# Patient Record
Sex: FEMALE | Race: WHITE | NOT HISPANIC OR LATINO | Employment: FULL TIME | URBAN - METROPOLITAN AREA
[De-identification: names, ages, dates, MRNs, and addresses within clinical notes are randomized per-mention and may not be internally consistent; named-entity substitution may affect disease eponyms.]

---

## 2017-03-15 ENCOUNTER — TRANSCRIBE ORDERS (OUTPATIENT)
Dept: MAMMOGRAPHY | Facility: HOSPITAL | Age: 57
End: 2017-03-15

## 2017-03-15 DIAGNOSIS — Z12.31 VISIT FOR SCREENING MAMMOGRAM: Primary | ICD-10-CM

## 2017-03-27 ENCOUNTER — APPOINTMENT (OUTPATIENT)
Dept: OTHER | Facility: HOSPITAL | Age: 57
End: 2017-03-27

## 2017-03-27 ENCOUNTER — HOSPITAL ENCOUNTER (OUTPATIENT)
Dept: MAMMOGRAPHY | Facility: HOSPITAL | Age: 57
Discharge: HOME | End: 2017-03-27
Admitting: INTERNAL MEDICINE

## 2017-03-27 DIAGNOSIS — Z92.89 H/O MAMMOGRAM: ICD-10-CM

## 2017-03-27 DIAGNOSIS — Z12.31 VISIT FOR SCREENING MAMMOGRAM: ICD-10-CM

## 2017-03-27 PROCEDURE — 77067 SCR MAMMO BI INCL CAD: CPT | Performed by: RADIOLOGY

## 2017-03-27 PROCEDURE — G0202 SCR MAMMO BI INCL CAD: HCPCS

## 2017-03-27 PROCEDURE — 77063 BREAST TOMOSYNTHESIS BI: CPT

## 2017-03-27 PROCEDURE — 77063 BREAST TOMOSYNTHESIS BI: CPT | Performed by: RADIOLOGY

## 2017-03-31 ENCOUNTER — TRANSCRIBE ORDERS (OUTPATIENT)
Dept: PHYSICAL THERAPY | Facility: HOSPITAL | Age: 57
End: 2017-03-31

## 2017-03-31 DIAGNOSIS — M54.30 SCIATICA, UNSPECIFIED LATERALITY: Primary | ICD-10-CM

## 2017-06-20 ENCOUNTER — HOSPITAL ENCOUNTER (EMERGENCY)
Facility: HOSPITAL | Age: 57
Discharge: HOME | End: 2017-06-20
Attending: EMERGENCY MEDICINE | Admitting: EMERGENCY MEDICINE

## 2017-06-20 ENCOUNTER — APPOINTMENT (OUTPATIENT)
Dept: GENERAL RADIOLOGY | Facility: HOSPITAL | Age: 57
End: 2017-06-20

## 2017-06-20 VITALS
WEIGHT: 244 LBS | DIASTOLIC BLOOD PRESSURE: 78 MMHG | SYSTOLIC BLOOD PRESSURE: 114 MMHG | OXYGEN SATURATION: 98 % | BODY MASS INDEX: 47.91 KG/M2 | RESPIRATION RATE: 16 BRPM | HEIGHT: 60 IN | HEART RATE: 93 BPM | TEMPERATURE: 97.8 F

## 2017-06-20 DIAGNOSIS — J01.00 ACUTE MAXILLARY SINUSITIS, RECURRENCE NOT SPECIFIED: ICD-10-CM

## 2017-06-20 DIAGNOSIS — R06.02 SHORTNESS OF BREATH: Primary | ICD-10-CM

## 2017-06-20 LAB
ALBUMIN SERPL-MCNC: 4.7 G/DL (ref 3.2–4.8)
ALBUMIN/GLOB SERPL: 1.4 G/DL (ref 1.5–2.5)
ALP SERPL-CCNC: 80 U/L (ref 25–100)
ALT SERPL W P-5'-P-CCNC: 27 U/L (ref 7–40)
ANION GAP SERPL CALCULATED.3IONS-SCNC: 12 MMOL/L (ref 3–11)
AST SERPL-CCNC: 28 U/L (ref 0–33)
B-HCG UR QL: NEGATIVE
BASOPHILS # BLD AUTO: 0.08 10*3/MM3 (ref 0–0.2)
BASOPHILS NFR BLD AUTO: 0.8 % (ref 0–1)
BILIRUB SERPL-MCNC: 0.2 MG/DL (ref 0.3–1.2)
BNP SERPL-MCNC: 21 PG/ML (ref 0–100)
BUN BLD-MCNC: 11 MG/DL (ref 9–23)
BUN/CREAT SERPL: 15.7 (ref 7–25)
CALCIUM SPEC-SCNC: 10.3 MG/DL (ref 8.7–10.4)
CHLORIDE SERPL-SCNC: 103 MMOL/L (ref 99–109)
CO2 SERPL-SCNC: 25 MMOL/L (ref 20–31)
CREAT BLD-MCNC: 0.7 MG/DL (ref 0.6–1.3)
DEPRECATED RDW RBC AUTO: 42.3 FL (ref 37–54)
EOSINOPHIL # BLD AUTO: 0.22 10*3/MM3 (ref 0.1–0.3)
EOSINOPHIL NFR BLD AUTO: 2.3 % (ref 0–3)
ERYTHROCYTE [DISTWIDTH] IN BLOOD BY AUTOMATED COUNT: 14.8 % (ref 11.3–14.5)
GFR SERPL CREATININE-BSD FRML MDRD: 87 ML/MIN/1.73
GLOBULIN UR ELPH-MCNC: 3.4 GM/DL
GLUCOSE BLD-MCNC: 144 MG/DL (ref 70–100)
HCT VFR BLD AUTO: 35.5 % (ref 34.5–44)
HGB BLD-MCNC: 11 G/DL (ref 11.5–15.5)
HOLD SPECIMEN: NORMAL
HOLD SPECIMEN: NORMAL
IMM GRANULOCYTES # BLD: 0.03 10*3/MM3 (ref 0–0.03)
IMM GRANULOCYTES NFR BLD: 0.3 % (ref 0–0.6)
INTERNAL NEGATIVE CONTROL: NEGATIVE
INTERNAL POSITIVE CONTROL: POSITIVE
LYMPHOCYTES # BLD AUTO: 1.68 10*3/MM3 (ref 0.6–4.8)
LYMPHOCYTES NFR BLD AUTO: 17.3 % (ref 24–44)
Lab: NORMAL
MCH RBC QN AUTO: 24.2 PG (ref 27–31)
MCHC RBC AUTO-ENTMCNC: 31 G/DL (ref 32–36)
MCV RBC AUTO: 78.2 FL (ref 80–99)
MONOCYTES # BLD AUTO: 0.71 10*3/MM3 (ref 0–1)
MONOCYTES NFR BLD AUTO: 7.3 % (ref 0–12)
NEUTROPHILS # BLD AUTO: 7 10*3/MM3 (ref 1.5–8.3)
NEUTROPHILS NFR BLD AUTO: 72 % (ref 41–71)
PLATELET # BLD AUTO: 347 10*3/MM3 (ref 150–450)
PMV BLD AUTO: 10.8 FL (ref 6–12)
POTASSIUM BLD-SCNC: 4.2 MMOL/L (ref 3.5–5.5)
PROT SERPL-MCNC: 8.1 G/DL (ref 5.7–8.2)
RBC # BLD AUTO: 4.54 10*6/MM3 (ref 3.89–5.14)
SODIUM BLD-SCNC: 140 MMOL/L (ref 132–146)
TROPONIN I SERPL-MCNC: 0 NG/ML (ref 0–0.07)
WBC NRBC COR # BLD: 9.72 10*3/MM3 (ref 3.5–10.8)
WHOLE BLOOD HOLD SPECIMEN: NORMAL
WHOLE BLOOD HOLD SPECIMEN: NORMAL

## 2017-06-20 PROCEDURE — 80053 COMPREHEN METABOLIC PANEL: CPT | Performed by: EMERGENCY MEDICINE

## 2017-06-20 PROCEDURE — 83880 ASSAY OF NATRIURETIC PEPTIDE: CPT | Performed by: EMERGENCY MEDICINE

## 2017-06-20 PROCEDURE — 99284 EMERGENCY DEPT VISIT MOD MDM: CPT

## 2017-06-20 PROCEDURE — 93005 ELECTROCARDIOGRAM TRACING: CPT | Performed by: EMERGENCY MEDICINE

## 2017-06-20 PROCEDURE — 71010 HC CHEST PA OR AP: CPT

## 2017-06-20 PROCEDURE — 85025 COMPLETE CBC W/AUTO DIFF WBC: CPT | Performed by: EMERGENCY MEDICINE

## 2017-06-20 PROCEDURE — 84484 ASSAY OF TROPONIN QUANT: CPT

## 2017-06-20 RX ORDER — SODIUM CHLORIDE 0.9 % (FLUSH) 0.9 %
10 SYRINGE (ML) INJECTION AS NEEDED
Status: DISCONTINUED | OUTPATIENT
Start: 2017-06-20 | End: 2017-06-20 | Stop reason: HOSPADM

## 2017-06-20 RX ORDER — AZITHROMYCIN 250 MG/1
TABLET, FILM COATED ORAL
Qty: 6 TABLET | Refills: 0 | Status: SHIPPED | OUTPATIENT
Start: 2017-06-20 | End: 2018-10-15

## 2017-06-20 RX ORDER — AMLODIPINE BESYLATE 10 MG/1
10 TABLET ORAL DAILY
COMMUNITY

## 2017-06-20 RX ORDER — OXYMETAZOLINE HYDROCHLORIDE 0.05 G/100ML
2 SPRAY NASAL ONCE
Status: COMPLETED | OUTPATIENT
Start: 2017-06-20 | End: 2017-06-20

## 2017-06-20 RX ORDER — PRAVASTATIN SODIUM 40 MG
40 TABLET ORAL DAILY
COMMUNITY

## 2017-06-20 RX ORDER — HYDROCHLOROTHIAZIDE 25 MG/1
25 TABLET ORAL DAILY
COMMUNITY

## 2017-06-20 RX ORDER — NAPROXEN 500 MG/1
500 TABLET ORAL 2 TIMES DAILY WITH MEALS
COMMUNITY

## 2017-06-20 RX ORDER — PREDNISONE 10 MG/1
TABLET ORAL
Qty: 21 TABLET | Refills: 0 | Status: SHIPPED | OUTPATIENT
Start: 2017-06-20 | End: 2018-10-15

## 2017-06-20 RX ADMIN — OXYMETAZOLINE HYDROCHLORIDE 2 SPRAY: 5 SPRAY NASAL at 18:26

## 2017-06-20 NOTE — DISCHARGE INSTRUCTIONS
Meds as prescribed.  Use an over the counter decongestant such as Sudafed as directed.  Return if worse.  Follow up with your doctor if not improving.

## 2017-06-20 NOTE — ED PROVIDER NOTES
Subjective   HPI Comments: 56-year-old female presents to the emergency department with complaints of shortness of breath.  The patient states that she has had shortness of breath off-and-on for about 2 weeks.  The symptoms worsened today.  She has also had some sinus congestion.  She has had no cough or fever.  No nausea or chest pain.  She has chronic bilateral lower extremity edema.  The patient denies any history of CHF.  She notes that she is unable to lie flat for several years now due to shortness of breath when she is supine.  Past medical history of diabetes, hypertension and hyperlipidemia.  She is a nonsmoker.  No alcohol or drug use.  The patient was seen at the health department for days ago as part of her routine checkup for her diabetes.  At the time she had no shortness of breath and failed to mention it to the practitioner.    Patient is a 56 y.o. female presenting with shortness of breath.   History provided by:  Patient  Shortness of Breath   Severity:  Moderate  Duration: off/on for 2 wks, worse today.  Timing:  Intermittent  Progression:  Waxing and waning  Chronicity:  Recurrent  Context comment:  At rest  Relieved by:  Nothing  Worsened by:  Activity (worse on lying supine)  Ineffective treatments:  None tried  Associated symptoms: no abdominal pain, no chest pain, no cough, no diaphoresis, no ear pain, no fever, no headaches, no rash, no sore throat, no sputum production, no vomiting and no wheezing    Risk factors: obesity    Risk factors: no hx of PE/DVT, no prolonged immobilization, no recent surgery and no tobacco use        Review of Systems   Constitutional: Positive for fatigue. Negative for chills, diaphoresis and fever.   HENT: Negative for congestion, ear pain, nosebleeds, rhinorrhea and sore throat.    Eyes: Negative for pain, discharge and visual disturbance.   Respiratory: Positive for shortness of breath. Negative for cough, sputum production and wheezing.    Cardiovascular:  Positive for leg swelling (chronic bilateral). Negative for chest pain and palpitations.   Gastrointestinal: Negative for abdominal pain, blood in stool, diarrhea, nausea and vomiting.   Endocrine: Negative.    Genitourinary: Negative for dysuria, hematuria and urgency.   Musculoskeletal: Negative for arthralgias and back pain.   Skin: Negative for pallor and rash.   Allergic/Immunologic: Negative for immunocompromised state.   Neurological: Negative for dizziness, speech difficulty, weakness and headaches.   Hematological: Negative for adenopathy. Does not bruise/bleed easily.   Psychiatric/Behavioral: Negative.        Past Medical History:   Diagnosis Date   • Diabetes mellitus    • Hyperlipidemia    • Hypertension        No Known Allergies    Past Surgical History:   Procedure Laterality Date   •  SECTION         Family History   Problem Relation Age of Onset   • Breast cancer Neg Hx    • Ovarian cancer Neg Hx        Social History     Social History   • Marital status:      Spouse name: N/A   • Number of children: N/A   • Years of education: N/A     Social History Main Topics   • Smoking status: Never Smoker   • Smokeless tobacco: None   • Alcohol use No   • Drug use: No   • Sexual activity: Not Asked     Other Topics Concern   • None     Social History Narrative   • None           Objective   Physical Exam   Constitutional: She is oriented to person, place, and time. She appears well-developed and well-nourished. No distress.   HENT:   Head: Normocephalic and atraumatic.   Nose: Nose normal.   Mouth/Throat: Oropharynx is clear and moist.   Some tenderness on palpation over maxillary and frontal sinuses.  Poor nasal patency.  Boggy nasal mucosa.   Eyes: EOM are normal. Pupils are equal, round, and reactive to light. No scleral icterus.   Neck: Normal range of motion. Neck supple.   Cardiovascular: Normal rate, regular rhythm, normal heart sounds and intact distal pulses.    No murmur  heard.  Pulmonary/Chest: Effort normal and breath sounds normal. No respiratory distress. She has no wheezes. She has no rales. She exhibits no tenderness.   Abdominal: Soft. Bowel sounds are normal. There is no tenderness.   obese   Musculoskeletal: Normal range of motion. She exhibits edema (moderate bilateral lower extremity edema). She exhibits no tenderness.   Neurological: She is alert and oriented to person, place, and time.   Skin: Skin is warm and dry. No rash noted. She is not diaphoretic.   Psychiatric: She has a normal mood and affect.   Nursing note and vitals reviewed.      Procedures         ED Course  ED Course    No acute findings on chest xray.  Normal labs.  Her symptoms seem to be more associated with nasal congestion than chest congestion.  I used Afran nasal spray on her and a bit later she felt much better.  She is tender over her sinuses.  Will d/c on prednisone and Zithromax and have her f/u with her PCP if symptoms persist.    Recent Results (from the past 24 hour(s))   Comprehensive Metabolic Panel    Collection Time: 06/20/17  4:12 PM   Result Value Ref Range    Glucose 144 (H) 70 - 100 mg/dL    BUN 11 9 - 23 mg/dL    Creatinine 0.70 0.60 - 1.30 mg/dL    Sodium 140 132 - 146 mmol/L    Potassium 4.2 3.5 - 5.5 mmol/L    Chloride 103 99 - 109 mmol/L    CO2 25.0 20.0 - 31.0 mmol/L    Calcium 10.3 8.7 - 10.4 mg/dL    Total Protein 8.1 5.7 - 8.2 g/dL    Albumin 4.70 3.20 - 4.80 g/dL    ALT (SGPT) 27 7 - 40 U/L    AST (SGOT) 28 0 - 33 U/L    Alkaline Phosphatase 80 25 - 100 U/L    Total Bilirubin 0.2 (L) 0.3 - 1.2 mg/dL    eGFR Non African Amer 87 >60 mL/min/1.73    Globulin 3.4 gm/dL    A/G Ratio 1.4 (L) 1.5 - 2.5 g/dL    BUN/Creatinine Ratio 15.7 7.0 - 25.0    Anion Gap 12.0 (H) 3.0 - 11.0 mmol/L   BNP    Collection Time: 06/20/17  4:12 PM   Result Value Ref Range    BNP 21.0 0.0 - 100.0 pg/mL   Light Blue Top    Collection Time: 06/20/17  4:12 PM   Result Value Ref Range    Extra Tube hold  for add-on    Green Top (Gel)    Collection Time: 06/20/17  4:12 PM   Result Value Ref Range    Extra Tube Hold for add-ons.    Lavender Top    Collection Time: 06/20/17  4:12 PM   Result Value Ref Range    Extra Tube hold for add-on    Gold Top - SST    Collection Time: 06/20/17  4:12 PM   Result Value Ref Range    Extra Tube Hold for add-ons.    CBC Auto Differential    Collection Time: 06/20/17  4:12 PM   Result Value Ref Range    WBC 9.72 3.50 - 10.80 10*3/mm3    RBC 4.54 3.89 - 5.14 10*6/mm3    Hemoglobin 11.0 (L) 11.5 - 15.5 g/dL    Hematocrit 35.5 34.5 - 44.0 %    MCV 78.2 (L) 80.0 - 99.0 fL    MCH 24.2 (L) 27.0 - 31.0 pg    MCHC 31.0 (L) 32.0 - 36.0 g/dL    RDW 14.8 (H) 11.3 - 14.5 %    RDW-SD 42.3 37.0 - 54.0 fl    MPV 10.8 6.0 - 12.0 fL    Platelets 347 150 - 450 10*3/mm3    Neutrophil % 72.0 (H) 41.0 - 71.0 %    Lymphocyte % 17.3 (L) 24.0 - 44.0 %    Monocyte % 7.3 0.0 - 12.0 %    Eosinophil % 2.3 0.0 - 3.0 %    Basophil % 0.8 0.0 - 1.0 %    Immature Grans % 0.3 0.0 - 0.6 %    Neutrophils, Absolute 7.00 1.50 - 8.30 10*3/mm3    Lymphocytes, Absolute 1.68 0.60 - 4.80 10*3/mm3    Monocytes, Absolute 0.71 0.00 - 1.00 10*3/mm3    Eosinophils, Absolute 0.22 0.10 - 0.30 10*3/mm3    Basophils, Absolute 0.08 0.00 - 0.20 10*3/mm3    Immature Grans, Absolute 0.03 0.00 - 0.03 10*3/mm3   POC Troponin, Rapid    Collection Time: 06/20/17  4:16 PM   Result Value Ref Range    Troponin I 0.00 0.00 - 0.07 ng/mL   POCT pregnancy, urine    Collection Time: 06/20/17  4:55 PM   Result Value Ref Range    HCG, Urine, QL Negative Negative    Lot Number [id number]     Internal Positive Control Positive     Internal Negative Control Negative      Note: In addition to lab results from this visit, the labs listed above may include labs taken at another facility or during a different encounter within the last 24 hours. Please correlate lab times with ED admission and discharge times for further clarification of the services performed  "during this visit.    XR Chest 1 View    (Results Pending)     Vitals:    06/20/17 1538 06/20/17 1730 06/20/17 1800   BP: 158/78 148/97 118/85   BP Location: Left arm     Patient Position: Sitting     Pulse: 93     Resp: 16     Temp: 97.8 °F (36.6 °C)     TempSrc: Oral     SpO2: 97% 96% 98%   Weight: 244 lb (111 kg)     Height: 60\" (152.4 cm)       Medications   sodium chloride 0.9 % flush 10 mL (not administered)   oxymetazoline (AFRIN) nasal spray 2 spray (2 sprays Each Nare Given by Other 6/20/17 1826)     ECG/EMG Results (last 24 hours)     Procedure Component Value Units Date/Time    ECG 12 Lead [[id number]] Collected:  06/20/17 1542     Updated:  06/20/17 1617                    MDM    Final diagnoses:   Shortness of breath   Acute maxillary sinusitis, recurrence not specified            PREMA Fenton  06/20/17 1808    "

## 2018-08-28 ENCOUNTER — TRANSCRIBE ORDERS (OUTPATIENT)
Dept: ADMINISTRATIVE | Facility: HOSPITAL | Age: 58
End: 2018-08-28

## 2018-08-28 DIAGNOSIS — Z12.31 VISIT FOR SCREENING MAMMOGRAM: Primary | ICD-10-CM

## 2018-09-18 ENCOUNTER — HOSPITAL ENCOUNTER (OUTPATIENT)
Dept: MAMMOGRAPHY | Facility: HOSPITAL | Age: 58
Discharge: HOME | End: 2018-09-18
Admitting: INTERNAL MEDICINE

## 2018-09-18 DIAGNOSIS — Z12.31 VISIT FOR SCREENING MAMMOGRAM: ICD-10-CM

## 2018-09-18 PROCEDURE — 77067 SCR MAMMO BI INCL CAD: CPT | Performed by: RADIOLOGY

## 2018-09-18 PROCEDURE — 77063 BREAST TOMOSYNTHESIS BI: CPT

## 2018-09-18 PROCEDURE — 77067 SCR MAMMO BI INCL CAD: CPT

## 2018-09-18 PROCEDURE — 77063 BREAST TOMOSYNTHESIS BI: CPT | Performed by: RADIOLOGY

## 2018-10-11 ENCOUNTER — CONSULT (OUTPATIENT)
Dept: SLEEP MEDICINE | Facility: HOSPITAL | Age: 58
End: 2018-10-11

## 2018-10-11 VITALS
SYSTOLIC BLOOD PRESSURE: 154 MMHG | TEMPERATURE: 97.5 F | WEIGHT: 245.8 LBS | BODY MASS INDEX: 46.41 KG/M2 | HEART RATE: 102 BPM | HEIGHT: 61 IN | OXYGEN SATURATION: 98 % | DIASTOLIC BLOOD PRESSURE: 83 MMHG

## 2018-10-11 DIAGNOSIS — G25.81 RESTLESS LEGS SYNDROME (RLS): ICD-10-CM

## 2018-10-11 DIAGNOSIS — E66.01 MORBID OBESITY WITH BODY MASS INDEX (BMI) OF 40.0 TO 49.9 (HCC): ICD-10-CM

## 2018-10-11 DIAGNOSIS — G47.33 OBSTRUCTIVE SLEEP APNEA, ADULT: ICD-10-CM

## 2018-10-11 DIAGNOSIS — R06.83 SNORING: Primary | ICD-10-CM

## 2018-10-11 PROCEDURE — 99204 OFFICE O/P NEW MOD 45 MIN: CPT | Performed by: INTERNAL MEDICINE

## 2018-10-11 RX ORDER — PHENOL 1.4 %
600 AEROSOL, SPRAY (ML) MUCOUS MEMBRANE DAILY
COMMUNITY

## 2018-10-11 RX ORDER — CHOLECALCIFEROL (VITAMIN D3) 50 MCG
2000 TABLET ORAL DAILY
COMMUNITY

## 2018-10-11 NOTE — PROGRESS NOTES
Subjective   Kylah Osorio is a 58 y.o. female is being seen for consultation today at the request of Renita Esquivel MD  for the evaluation of snoring and nonrestorative sleep.    History of Present Illness  Patient complains of not sleeping well.  She is been noted to have occasional snoring but says she doesn't snore too much because she sits up to sleep in a chair for the past 2 years.  Apparently sleeping in recliner.  She says she cannot breathe if she is lying flat.  She denies being told she had apneas.  She denies awakening gasping for breath.  She says she's generally not rested in the morning.  She denies morning headaches.  She will fall asleep if sitting quietly during the day.  She denies any problems while driving.    She has occasional snoring.  She says she's probably sleeping on the equivalent of 3 pillows at this time.  She is awakened with a dry mouth gasping coughing and has trouble breathing through her nose both day and night but denies ever breaking her nose.  She denies having significant reflux.  She denies hypnagogic hallucinations sleep paralysis or cataplexy.  She says she does have a history of restless leg syndrome and is on Requip.  She also has arthritis pain that bothers her at night.    She goes to bed about 11:30 PM.  She'll fall asleep in about an hour.  She says she awakens at least 3-4 times during the night.  She thinks gets about 4 hours of sleep arising at 30 in the morning.  She says she still tired.  She occasionally naps during the day but says it's no more than 10-20 minutes.  She has a history of hypertension for the past 6 years also diabetes for 6 years.  She denies any history coronary artery disease.      She has environmental allergies and is on Zyrtec      Current Outpatient Prescriptions:   •  amLODIPine (NORVASC) 10 MG tablet, Take 10 mg by mouth Daily., Disp: , Rfl:   •  calcium carbonate (OS-EAMON) 600 MG tablet, Take 600 mg by mouth Daily., Disp: , Rfl:   •   Cholecalciferol (VITAMIN D) 2000 units tablet, Take 2,000 Units by mouth Daily., Disp: , Rfl:   •  hydrochlorothiazide (HYDRODIURIL) 25 MG tablet, Take 25 mg by mouth Daily., Disp: , Rfl:   •  metFORMIN (GLUCOPHAGE) 850 MG tablet, Take 850 mg by mouth 2 (Two) Times a Day With Meals., Disp: , Rfl:   •  naproxen (NAPROSYN) 500 MG tablet, Take 500 mg by mouth 2 (Two) Times a Day With Meals., Disp: , Rfl:   •  pravastatin (PRAVACHOL) 40 MG tablet, Take 40 mg by mouth Daily., Disp: , Rfl:   •  azithromycin (ZITHROMAX) 250 MG tablet, Take 2 tablets the first day, then 1 tablet daily for 4 days., Disp: 6 tablet, Rfl: 0  •  PredniSONE (DELTASONE) 10 MG (21) tablet pack, Use as directed on package, Disp: 21 tablet, Rfl: 0    Smoking status: Never Smoker                                                              Smokeless tobacco: Never Used                           History   Alcohol Use No       Caffeine: She has 2 caffeinated soft drinks each day    Past Medical History:   Diagnosis Date   • Arthritis    • Diabetes mellitus (CMS/HCC)    • Hyperlipidemia    • Hypertension        Past Surgical History:   Procedure Laterality Date   •  SECTION ×2          Family History   Problem Relation Age of Onset   • Diabetes Mother    • Stroke Mother    • Obesity Mother    • Heart disease Father    • Diabetes Brother    • Breast cancer Neg Hx    • Ovarian cancer Neg Hx        The following portions of the patient's history were reviewed and updated as appropriate: allergies, current medications, past family history, past medical history, past social history, past surgical history and problem list.    Review of Systems   Constitutional: Positive for fatigue.   HENT: Positive for sinus pressure.    Eyes: Negative.    Respiratory: Negative.    Cardiovascular: Positive for leg swelling.   Gastrointestinal: Negative.    Endocrine: Positive for polydipsia.   Genitourinary: Negative.    Musculoskeletal: Positive for arthralgias, gait  "problem, joint swelling and myalgias.   Skin: Negative.    Allergic/Immunologic: Positive for environmental allergies.   Neurological: Positive for numbness.   Hematological: Negative.    Psychiatric/Behavioral: Negative.     [place] scores 4/24    Objective     /83   Pulse 102   Temp 97.5 °F (36.4 °C)   Ht 154.9 cm (61\")   Wt 111 kg (245 lb 12.8 oz)   SpO2 98%   BMI 46.44 kg/m²      Physical Exam   Constitutional: She is oriented to person, place, and time. She appears well-developed and well-nourished.   She is morbidly obese.   HENT:   Head: Normocephalic and atraumatic.   She has nasal airway narrowing and Mallampati class for anatomy.   Eyes: Pupils are equal, round, and reactive to light. EOM are normal.   Neck: Normal range of motion. Neck supple.   Cardiovascular: Normal rate, regular rhythm and normal heart sounds.    Pulmonary/Chest: Effort normal and breath sounds normal.   Abdominal: Soft. Bowel sounds are normal.   Musculoskeletal: Normal range of motion. She exhibits edema.   She has 1+ pedal edema   Neurological: She is alert and oriented to person, place, and time.   Skin: Skin is warm and dry.   Psychiatric: She has a normal mood and affect. Her behavior is normal.         Assessment/Plan   Kylah was seen today for sleeping problem.    Diagnoses and all orders for this visit:    Snoring  -     Home Sleep Study; Future    Obstructive sleep apnea, adult  -     Home Sleep Study; Future    Morbid obesity with body mass index (BMI) of 40.0 to 49.9 (CMS/HCC)    Restless legs syndrome (RLS)    Patient has a history of snoring and nonrestorative sleep.  She has been managing this by apparently some sleeping sitting up.  I think she gives an excellent story for obstructive sleep apnea.    Plan to proceed to home sleep testing.  We have discussed possible therapies including CPAP, weight control, oral appliances, and surgery.  Also discussed long-term consequences of obstructive sleep apnea.  She " further has a history of restless leg syndrome and is on medications this time.  This may need further evaluation after her study.    She is encouraged to lose weight.  She is encouraged to avoid alcohol and sedatives close to bedtime.  She is encouraged practice lateral position sleep.  We'll see her back then following her sleep study.         Sj Ye MD [facility]  Sleep Medicine  Pulmonary and Critical Care Medicine

## 2018-10-11 NOTE — PATIENT INSTRUCTIONS
Restless Legs Syndrome  Restless legs syndrome is a condition that causes uncomfortable feelings or sensations in the legs, especially while sitting or lying down. The sensations usually cause an overwhelming urge to move the legs. The arms can also sometimes be affected.  The condition can range from mild to severe. The symptoms often interfere with a person's ability to sleep.  What are the causes?  The cause of this condition is not known.  What increases the risk?  This condition is more likely to develop in:  · People who are older than age 50.  · Pregnant women. In general, restless legs syndrome is more common in women than in men.  · People who have a family history of the condition.  · People who have certain medical conditions, such as iron deficiency, kidney disease, Parkinson disease, or nerve damage.  · People who take certain medicines, such as medicines for high blood pressure, nausea, colds, allergies, depression, and some heart conditions.    What are the signs or symptoms?  The main symptom of this condition is uncomfortable sensations in the legs. These sensations may be:  · Described as pulling, tingling, prickling, throbbing, crawling, or burning.  · Worse while you are sitting or lying down.  · Worse during periods of rest or inactivity.  · Worse at night, often interfering with your sleep.  · Accompanied by a very strong urge to move your legs.  · Temporarily relieved by movement of your legs.    The sensations usually affect both sides of the body. The arms can also be affected, but this is rare. People who have this condition often have tiredness during the day because of their lack of sleep at night.  How is this diagnosed?  This condition may be diagnosed based on your description of the symptoms. You may also have tests, including blood tests, to check for other conditions that may lead to your symptoms. In some cases, you may be asked to spend some time in a sleep lab so your sleeping  can be monitored.  How is this treated?  Treatment for this condition is focused on managing the symptoms. Treatment may include:  · Self-help and lifestyle changes.  · Medicines.    Follow these instructions at home:  · Take medicines only as directed by your health care provider.  · Try these methods to get temporary relief from the uncomfortable sensations:  ? Massage your legs.  ? Walk or stretch.  ? Take a cold or hot bath.  · Practice good sleep habits. For example, go to bed and get up at the same time every day.  · Exercise regularly.  · Practice ways of relaxing, such as yoga or meditation.  · Avoid caffeine and alcohol.  · Do not use any tobacco products, including cigarettes, chewing tobacco, or electronic cigarettes. If you need help quitting, ask your health care provider.  · Keep all follow-up visits as directed by your health care provider. This is important.  Contact a health care provider if:  Your symptoms do not improve with treatment, or they get worse.  This information is not intended to replace advice given to you by your health care provider. Make sure you discuss any questions you have with your health care provider.  Document Released: 12/08/2003 Document Revised: 05/25/2017 Document Reviewed: 12/14/2015  [employer] Interactive Patient Education © 2018 [employer]  Sleep Apnea  Sleep apnea is a condition in which breathing pauses or becomes shallow during sleep. Episodes of sleep apnea usually last 10 seconds or longer, and they may occur as many as 20 times an hour. Sleep apnea disrupts your sleep and keeps your body from getting the rest that it needs. This condition can increase your risk of certain health problems, including:  · Heart attack.  · Stroke.  · Obesity.  · Diabetes.  · Heart failure.  · Irregular heartbeat.    There are three kinds of sleep apnea:  · Obstructive sleep apnea. This kind is caused by a blocked or collapsed airway.  · Central sleep apnea. This kind happens when  the part of the brain that controls breathing does not send the correct signals to the muscles that control breathing.  · Mixed sleep apnea. This is a combination of obstructive and central sleep apnea.    What are the causes?  The most common cause of this condition is a collapsed or blocked airway. An airway can collapse or become blocked if:  · Your throat muscles are abnormally relaxed.  · Your tongue and tonsils are larger than normal.  · You are overweight.  · Your airway is smaller than normal.    What increases the risk?  This condition is more likely to develop in people who:  · Are overweight.  · Smoke.  · Have a smaller than normal airway.  · Are elderly.  · Are male.  · Drink alcohol.  · Take sedatives or tranquilizers.  · Have a family history of sleep apnea.    What are the signs or symptoms?  Symptoms of this condition include:  · Trouble staying asleep.  · Daytime sleepiness and tiredness.  · Irritability.  · Loud snoring.  · Morning headaches.  · Trouble concentrating.  · Forgetfulness.  · Decreased interest in sex.  · Unexplained sleepiness.  · Mood swings.  · Personality changes.  · Feelings of depression.  · Waking up often during the night to urinate.  · Dry mouth.  · Sore throat.    How is this diagnosed?  This condition may be diagnosed with:  · A medical history.  · A physical exam.  · A series of tests that are done while you are sleeping (sleep study). These tests are usually done in a sleep lab, but they may also be done at home.    How is this treated?  Treatment for this condition aims to restore normal breathing and to ease symptoms during sleep. It may involve managing health issues that can affect breathing, such as high blood pressure or obesity. Treatment may include:  · Sleeping on your side.  · Using a decongestant if you have nasal congestion.  · Avoiding the use of depressants, including alcohol, sedatives, and narcotics.  · Losing weight if you are overweight.  · Making changes  to your diet.  · Quitting smoking.  · Using a device to open your airway while you sleep, such as:  ? An oral appliance. This is a custom-made mouthpiece that shifts your lower jaw forward.  ? A continuous positive airway pressure (CPAP) device. This device delivers oxygen to your airway through a mask.  ? A nasal expiratory positive airway pressure (EPAP) device. This device has valves that you put into each nostril.  ? A bi-level positive airway pressure (BPAP) device. This device delivers oxygen to your airway through a mask.  · Surgery if other treatments do not work. During surgery, excess tissue is removed to create a wider airway.    It is important to get treatment for sleep apnea. Without treatment, this condition can lead to:  · High blood pressure.  · Coronary artery disease.  · (Men) An inability to achieve or maintain an erection (impotence).  · Reduced thinking abilities.    Follow these instructions at home:  · Make any lifestyle changes that your health care provider recommends.  · Eat a healthy, well-balanced diet.  · Take over-the-counter and prescription medicines only as told by your health care provider.  · Avoid using depressants, including alcohol, sedatives, and narcotics.  · Take steps to lose weight if you are overweight.  · If you were given a device to open your airway while you sleep, use it only as told by your health care provider.  · Do not use any tobacco products, such as cigarettes, chewing tobacco, and e-cigarettes. If you need help quitting, ask your health care provider.  · Keep all follow-up visits as told by your health care provider. This is important.  Contact a health care provider if:  · The device that you received to open your airway during sleep is uncomfortable or does not seem to be working.  · Your symptoms do not improve.  · Your symptoms get worse.  Get help right away if:  · You develop chest pain.  · You develop shortness of breath.  · You develop discomfort in  your back, arms, or stomach.  · You have trouble speaking.  · You have weakness on one side of your body.  · You have drooping in your face.  These symptoms may represent a serious problem that is an emergency. Do not wait to see if the symptoms will go away. Get medical help right away. Call your local emergency services (911 in the [place] Do not drive yourself to the hospital.  This information is not intended to replace advice given to you by your health care provider. Make sure you discuss any questions you have with your health care provider.  Document Released: 12/08/2003 Document Revised: 08/13/2017 Document Reviewed: 09/26/2016  [employer] Interactive Patient Education © 2018 [employer]

## 2018-10-15 ENCOUNTER — HOSPITAL ENCOUNTER (OUTPATIENT)
Dept: SLEEP MEDICINE | Facility: HOSPITAL | Age: 58
Discharge: HOME | End: 2018-10-15
Attending: INTERNAL MEDICINE | Admitting: INTERNAL MEDICINE

## 2018-10-15 VITALS — BODY MASS INDEX: 46.2 KG/M2 | HEIGHT: 61 IN | WEIGHT: 244.71 LBS

## 2018-10-15 DIAGNOSIS — G47.33 OBSTRUCTIVE SLEEP APNEA, ADULT: ICD-10-CM

## 2018-10-15 DIAGNOSIS — R06.83 SNORING: ICD-10-CM

## 2018-10-15 PROCEDURE — 95806 SLEEP STUDY UNATT&RESP EFFT: CPT

## 2018-10-15 PROCEDURE — 95806 SLEEP STUDY UNATT&RESP EFFT: CPT | Performed by: INTERNAL MEDICINE

## 2018-10-16 DIAGNOSIS — G47.33 MODERATE OBSTRUCTIVE SLEEP APNEA: Primary | ICD-10-CM

## 2018-10-19 NOTE — PROGRESS NOTES
CALLED PATIENT TO ADVISE OF STUDY RESULTS AND REMIND OF APPOINTMENT ON 10/22/18. NO ANSWER  10/19/18 TRC

## 2018-10-22 ENCOUNTER — OFFICE VISIT (OUTPATIENT)
Dept: SLEEP MEDICINE | Facility: HOSPITAL | Age: 58
End: 2018-10-22

## 2018-10-22 VITALS
HEART RATE: 98 BPM | WEIGHT: 243.8 LBS | HEIGHT: 61 IN | DIASTOLIC BLOOD PRESSURE: 81 MMHG | SYSTOLIC BLOOD PRESSURE: 139 MMHG | OXYGEN SATURATION: 96 % | BODY MASS INDEX: 46.03 KG/M2

## 2018-10-22 DIAGNOSIS — G47.33 OBSTRUCTIVE SLEEP APNEA, ADULT: ICD-10-CM

## 2018-10-22 DIAGNOSIS — E66.01 MORBID OBESITY WITH BODY MASS INDEX (BMI) OF 40.0 TO 49.9 (HCC): Primary | ICD-10-CM

## 2018-10-22 PROCEDURE — 99213 OFFICE O/P EST LOW 20 MIN: CPT | Performed by: NURSE PRACTITIONER

## 2018-10-22 NOTE — PROGRESS NOTES
Subjective: Follow-up        Chief Complaint:   Chief Complaint   Patient presents with   • Follow-up       HPI:    Kylah Osorio is a 58 y.o. female here for follow-up of linda.  Patient originally had a consult on 10/11/18 for gasping and snoring.  She also has morbid obesity and restless leg syndrome.  She had at home sleep study on 10/15/18 that did show moderate sleep apnea.  Patient is anxious to start CPAP therapy.      Current medications are:   Current Outpatient Prescriptions:   •  amLODIPine (NORVASC) 10 MG tablet, Take 10 mg by mouth Daily., Disp: , Rfl:   •  calcium carbonate (OS-EAMON) 600 MG tablet, Take 600 mg by mouth Daily., Disp: , Rfl:   •  Cholecalciferol (VITAMIN D) 2000 units tablet, Take 2,000 Units by mouth Daily., Disp: , Rfl:   •  hydrochlorothiazide (HYDRODIURIL) 25 MG tablet, Take 25 mg by mouth Daily., Disp: , Rfl:   •  metFORMIN (GLUCOPHAGE) 1000 MG tablet, Take 1,000 mg by mouth 2 (Two) Times a Day With Meals., Disp: , Rfl:   •  naproxen (NAPROSYN) 500 MG tablet, Take 500 mg by mouth 2 (Two) Times a Day With Meals., Disp: , Rfl:   •  pravastatin (PRAVACHOL) 40 MG tablet, Take 40 mg by mouth Daily., Disp: , Rfl: .      The patient's relevant past medical, surgical, family and social history were reviewed and updated in Epic as appropriate.       Review of Systems   HENT: Positive for sinus pain.    Respiratory: Positive for apnea.    Cardiovascular: Positive for leg swelling.   Endocrine: Positive for polydipsia.   Musculoskeletal: Positive for arthralgias, gait problem, joint swelling and myalgias.   Allergic/Immunologic: Positive for environmental allergies.   Neurological: Positive for numbness.   Psychiatric/Behavioral: Positive for sleep disturbance.   All other systems reviewed and are negative.        Objective:    Physical Exam   Constitutional: She is oriented to person, place, and time. She appears well-developed and well-nourished.   Morbid obesity   HENT:   Head:  Normocephalic and atraumatic.   Mouth/Throat: Oropharynx is clear and moist.   Mallampati 4 anatomy   Eyes: Conjunctivae are normal.   Neck: Neck supple. No thyromegaly present.   Cardiovascular: Normal rate and regular rhythm.    Pulmonary/Chest: Effort normal and breath sounds normal.   Lymphadenopathy:     She has no cervical adenopathy.   Neurological: She is alert and oriented to person, place, and time.   Skin: Skin is warm and dry.   Psychiatric: She has a normal mood and affect. Her behavior is normal. Judgment and thought content normal.   Nursing note and vitals reviewed.        ASSESSMENT/PLAN    Kylah was seen today for follow-up.    Diagnoses and all orders for this visit:    Morbid obesity with body mass index (BMI) of 40.0 to 49.9 (CMS/[facility])    Obstructive sleep apnea, adult            1. Counseled patient regarding multimodal approach with healthy nutrition, healthy sleep, regular physical activity, social activities, counseling, and medications. Encouraged to practice lateral sleep position. Avoid alcohol and sedatives close to bedtime.  2. Patient is interested in setting up CPAP therapy I will fax this  in for her.  follow-up in 31-90 days.    I have reviewed the results of my evaluation and impression and discussed my recommendations in detail with the patient.      Signed by  DIANE Vidal    October 22, 2018      CC: Renita Esquivel MD          No ref. provider found

## 2018-10-22 NOTE — PATIENT INSTRUCTIONS

## 2019-08-08 ENCOUNTER — TRANSCRIBE ORDERS (OUTPATIENT)
Dept: ADMINISTRATIVE | Facility: HOSPITAL | Age: 59
End: 2019-08-08

## 2019-08-08 DIAGNOSIS — Z12.31 VISIT FOR SCREENING MAMMOGRAM: Primary | ICD-10-CM

## 2019-09-30 ENCOUNTER — HOSPITAL ENCOUNTER (OUTPATIENT)
Dept: MAMMOGRAPHY | Facility: HOSPITAL | Age: 59
Discharge: HOME | End: 2019-09-30
Admitting: INTERNAL MEDICINE

## 2019-09-30 DIAGNOSIS — Z12.31 VISIT FOR SCREENING MAMMOGRAM: ICD-10-CM

## 2019-09-30 PROCEDURE — 77063 BREAST TOMOSYNTHESIS BI: CPT | Performed by: RADIOLOGY

## 2019-09-30 PROCEDURE — 77063 BREAST TOMOSYNTHESIS BI: CPT

## 2019-09-30 PROCEDURE — 77067 SCR MAMMO BI INCL CAD: CPT | Performed by: RADIOLOGY

## 2019-09-30 PROCEDURE — 77067 SCR MAMMO BI INCL CAD: CPT

## 2020-08-24 ENCOUNTER — TRANSCRIBE ORDERS (OUTPATIENT)
Dept: ADMINISTRATIVE | Facility: HOSPITAL | Age: 60
End: 2020-08-24

## 2020-08-24 DIAGNOSIS — Z12.31 VISIT FOR SCREENING MAMMOGRAM: Primary | ICD-10-CM

## 2020-11-30 ENCOUNTER — HOSPITAL ENCOUNTER (OUTPATIENT)
Dept: MAMMOGRAPHY | Facility: HOSPITAL | Age: 60
Discharge: HOME | End: 2020-11-30
Admitting: INTERNAL MEDICINE

## 2020-11-30 DIAGNOSIS — Z12.31 VISIT FOR SCREENING MAMMOGRAM: ICD-10-CM

## 2020-11-30 PROCEDURE — 77067 SCR MAMMO BI INCL CAD: CPT | Performed by: RADIOLOGY

## 2020-11-30 PROCEDURE — 77063 BREAST TOMOSYNTHESIS BI: CPT | Performed by: RADIOLOGY

## 2020-11-30 PROCEDURE — 77067 SCR MAMMO BI INCL CAD: CPT

## 2020-11-30 PROCEDURE — 77063 BREAST TOMOSYNTHESIS BI: CPT

## 2021-10-26 ENCOUNTER — TRANSCRIBE ORDERS (OUTPATIENT)
Dept: ADMINISTRATIVE | Facility: HOSPITAL | Age: 61
End: 2021-10-26

## 2021-10-26 DIAGNOSIS — Z12.31 VISIT FOR SCREENING MAMMOGRAM: Primary | ICD-10-CM

## 2021-12-06 ENCOUNTER — HOSPITAL ENCOUNTER (OUTPATIENT)
Dept: MAMMOGRAPHY | Facility: HOSPITAL | Age: 61
Discharge: HOME | End: 2021-12-06
Admitting: INTERNAL MEDICINE

## 2021-12-06 DIAGNOSIS — Z12.31 VISIT FOR SCREENING MAMMOGRAM: ICD-10-CM

## 2021-12-06 PROCEDURE — 77067 SCR MAMMO BI INCL CAD: CPT | Performed by: RADIOLOGY

## 2021-12-06 PROCEDURE — 77063 BREAST TOMOSYNTHESIS BI: CPT | Performed by: RADIOLOGY

## 2021-12-06 PROCEDURE — 77063 BREAST TOMOSYNTHESIS BI: CPT

## 2021-12-06 PROCEDURE — 77067 SCR MAMMO BI INCL CAD: CPT

## 2022-11-04 ENCOUNTER — TRANSCRIBE ORDERS (OUTPATIENT)
Dept: ADMINISTRATIVE | Facility: HOSPITAL | Age: 62
End: 2022-11-04

## 2022-11-04 DIAGNOSIS — Z12.31 VISIT FOR SCREENING MAMMOGRAM: Primary | ICD-10-CM

## 2022-12-19 ENCOUNTER — HOSPITAL ENCOUNTER (OUTPATIENT)
Dept: MAMMOGRAPHY | Facility: HOSPITAL | Age: 62
Discharge: HOME | End: 2022-12-19
Admitting: INTERNAL MEDICINE

## 2022-12-19 DIAGNOSIS — Z12.31 VISIT FOR SCREENING MAMMOGRAM: ICD-10-CM

## 2022-12-19 PROCEDURE — 77067 SCR MAMMO BI INCL CAD: CPT

## 2022-12-19 PROCEDURE — 77063 BREAST TOMOSYNTHESIS BI: CPT | Performed by: RADIOLOGY

## 2022-12-19 PROCEDURE — 77063 BREAST TOMOSYNTHESIS BI: CPT

## 2022-12-19 PROCEDURE — 77067 SCR MAMMO BI INCL CAD: CPT | Performed by: RADIOLOGY

## 2023-01-09 ENCOUNTER — HOSPITAL ENCOUNTER (OUTPATIENT)
Dept: MAMMOGRAPHY | Facility: HOSPITAL | Age: 63
Discharge: HOME | End: 2023-01-09
Payer: COMMERCIAL

## 2023-01-09 DIAGNOSIS — Z12.31 VISIT FOR SCREENING MAMMOGRAM: ICD-10-CM

## 2023-10-09 ENCOUNTER — TRANSCRIBE ORDERS (OUTPATIENT)
Dept: GENERAL RADIOLOGY | Facility: HOSPITAL | Age: 63
End: 2023-10-09
Payer: COMMERCIAL

## 2023-10-09 ENCOUNTER — HOSPITAL ENCOUNTER (OUTPATIENT)
Dept: GENERAL RADIOLOGY | Facility: HOSPITAL | Age: 63
Discharge: HOME | End: 2023-10-09
Admitting: INTERNAL MEDICINE
Payer: COMMERCIAL

## 2023-10-09 DIAGNOSIS — M25.551 HIP PAIN, RIGHT: ICD-10-CM

## 2023-10-09 DIAGNOSIS — M54.50 LOW BACK PAIN, UNSPECIFIED BACK PAIN LATERALITY, UNSPECIFIED CHRONICITY, UNSPECIFIED WHETHER SCIATICA PRESENT: Primary | ICD-10-CM

## 2023-10-09 PROCEDURE — 73502 X-RAY EXAM HIP UNI 2-3 VIEWS: CPT

## 2023-10-09 PROCEDURE — 72100 X-RAY EXAM L-S SPINE 2/3 VWS: CPT

## 2023-11-13 ENCOUNTER — TRANSCRIBE ORDERS (OUTPATIENT)
Dept: ADMINISTRATIVE | Facility: HOSPITAL | Age: 63
End: 2023-11-13
Payer: COMMERCIAL

## 2023-11-13 DIAGNOSIS — Z12.31 VISIT FOR SCREENING MAMMOGRAM: Primary | ICD-10-CM

## 2024-01-15 ENCOUNTER — HOSPITAL ENCOUNTER (OUTPATIENT)
Dept: MAMMOGRAPHY | Facility: HOSPITAL | Age: 64
Discharge: HOME | End: 2024-01-15
Admitting: INTERNAL MEDICINE
Payer: COMMERCIAL

## 2024-01-15 DIAGNOSIS — Z12.31 VISIT FOR SCREENING MAMMOGRAM: ICD-10-CM

## 2024-01-15 PROCEDURE — 77067 SCR MAMMO BI INCL CAD: CPT

## 2024-01-15 PROCEDURE — 77063 BREAST TOMOSYNTHESIS BI: CPT

## 2024-01-23 ENCOUNTER — TRANSCRIBE ORDERS (OUTPATIENT)
Dept: ADMINISTRATIVE | Facility: HOSPITAL | Age: 64
End: 2024-01-23
Payer: COMMERCIAL

## 2024-01-23 ENCOUNTER — HOSPITAL ENCOUNTER (OUTPATIENT)
Dept: GENERAL RADIOLOGY | Facility: HOSPITAL | Age: 64
Discharge: HOME | End: 2024-01-23
Payer: COMMERCIAL

## 2024-01-23 DIAGNOSIS — M25.512 LEFT SHOULDER PAIN, UNSPECIFIED CHRONICITY: ICD-10-CM

## 2024-01-23 DIAGNOSIS — M25.512 LEFT SHOULDER PAIN, UNSPECIFIED CHRONICITY: Primary | ICD-10-CM

## 2024-01-23 PROCEDURE — 72040 X-RAY EXAM NECK SPINE 2-3 VW: CPT

## 2024-01-23 PROCEDURE — 73030 X-RAY EXAM OF SHOULDER: CPT

## 2024-09-09 ENCOUNTER — TREATMENT (OUTPATIENT)
Dept: PHYSICAL THERAPY | Facility: CLINIC | Age: 64
End: 2024-09-09
Payer: COMMERCIAL

## 2024-09-09 DIAGNOSIS — M54.2 PAIN, NECK: Primary | ICD-10-CM

## 2024-09-09 PROCEDURE — 97110 THERAPEUTIC EXERCISES: CPT | Performed by: PHYSICAL THERAPIST

## 2024-09-09 PROCEDURE — 97161 PT EVAL LOW COMPLEX 20 MIN: CPT | Performed by: PHYSICAL THERAPIST

## 2024-09-09 NOTE — PROGRESS NOTES
"  Physical Therapy Initial Evaluation and Plan of Care             [address] [facility] [address]    [place] [address]    Patient: Kylah Osorio   : 1960  Diagnosis/ICD-10 Code:  Pain, neck [M54.2]  Referring practitioner: Jared Meraz MD  Date of Initial Visit: 2024  Today's Date: 9/10/2024  Patient seen for 1 session         Visit Diagnoses:    ICD-10-CM ICD-9-CM   1. Pain, neck  M54.2 723.1         Subjective Questionnaire: NDI:17/50=34% impairment       Subjective Evaluation    History of Present Illness  Mechanism of injury: Gradual onset neck pn 1-2 years ago, worsening over the past several months. Posterior lower cervical pn, constant. Can radiate into B shoulders. Experiences N/T in B hands 1-2x/wk, subsides if she pumps her hands open and closed. Exacerbated by virtually all activity, moving her head any direction, even resting.  feels weak. Feels \"crunching.\" Waiting on injections after starting PT. Takes tylenol. Has used tens unit but battery doesn't \"hold up.\" Daily frontal headache she manages w/ tylenol.    Followed by [facility] pn mgmt for chronic low back pn and neck pn. Has had several injections in back, needs to complete PT before they will approve neck injections.     Neuropathy in feet, on gabapentin    Subjective comment: neck pn  Patient Occupation: [employer]-prolonged standing, can sit as needed Quality of life: fair    Pain  Current pain ratin  At worst pain rating: 10    Social Support  Lives with: brother.    Hand dominance: right    Diagnostic Tests  Abnormal x-ray: Multilevel spondylosis change is present, with areas of disc osteophyte complex formation and facet  arthropathy, most notable at C5-6 and C6-7..    Patient Goals  Patient goals for therapy: decreased pain, increased motion, increased strength and independence with ADLs/IADLs             Treatment  See Exercise, Manual, and Modality Logs for complete treatment.   Access Code: " [email or web address]  URL: [email or web address]  Date: 09/09/2024  Prepared by: Chasity Chowdhury    Exercises  - Beginner Head Nod  - 2 x daily - 10 reps - 5 sec hold  - Supine Cervical Rotation AROM on Pillow  - 2 x daily - 5 reps - 5 sec hold  - Standing Shoulder Extension with Dowel  - 2 x daily - 15 reps - 3 sec hold  - Cervical Retraction at Wall  - 2 x daily - 10 reps - 5 sec hold    Objective          Tenderness     Additional Tenderness Details  Diffuse TTP B cervical PS, UT/LS, periscapular mm, lat/post deltoids, cervical sp/tp's, suboccipitals    Neurological Testing     Sensation   Cervical/Thoracic   Left   Intact: light touch    Right   Intact: light touch    Comments   Left light touch: paresthesia L 5th digit.     Reflexes   Left   Biceps (C5/C6): normal (2+)  Brachioradialis (C6): trace (1+)  Triceps (C7): normal (2+)    Right   Biceps (C5/C6): normal (2+)  Brachioradialis (C6): normal (2+)  Triceps (C7): normal (2+)    Active Range of Motion   Cervical/Thoracic Spine   Cervical    Flexion: 30 degrees with pain  Extension: 20 (midline cervical pn from occiput to CT junction) degrees with pain  Left lateral flexion: 10 (ipsilateral lower cervical pn into shoulder) degrees with pain  Right lateral flexion: 10 (pn/tightness posterior cervical (R>L)) degrees with pain  Left rotation: 15 degrees with pain  Right rotation: 20 degrees with pain    Additional Active Range of Motion Details  B shoulder elevation limited to approx 120 deg w/ mid/upper back pn  Shoulder ER WFL    Strength/Myotome Testing     Left Shoulder     Planes of Motion   Flexion: 3-   Abduction: 3-   External rotation at 0°: 4-     Right Shoulder     Planes of Motion   Flexion: 4+   Abduction: 4+   External rotation at 0°: 4+     Left Elbow   Flexion: 4-  Extension: 4-    Right Elbow   Flexion: 4+  Extension: 4+    Left Wrist/Hand   Wrist extension: 4  Wrist flexion: 4     (2nd hand position)   Left  strength (2nd hand position) 8  lbs    Right Wrist/Hand   Wrist extension: 5  Wrist flexion: 4+     (2nd hand position)   Right  strength (2nd hand position) 27 lbs    Additional Strength Details  Finger abd 3+/5 L; 4+/5 R    Tests     Additional Tests Details  Compression/distraction/spurling (-)  (-) onset UE symptoms w/ sustained cervical positions            Assessment & Plan       Assessment  Impairments: abnormal or restricted ROM, activity intolerance, impaired physical strength, lacks appropriate home exercise program and pain with function   Functional limitations: carrying objects, lifting, sleeping, pulling, uncomfortable because of pain, reaching behind back, reaching overhead and unable to perform repetitive tasks   Assessment details: Pt is a 64 YOF w/ DM who presents to PT w/ complaint of chronic back and neck pn. Neck pn is the most severe and function limiting so was the focus of today's visit. May assess low back at later date. Pt c/o chronic neck pn x1-2 years w/o CHET. Demo global loss of neck mobility and BUE weakness, diffuse TTP throughout cervical/periscapular bony and soft tissues. Paresthesia B hands, not provoked w/ cervical screen, ULTT. Diagnosed w/ CTS in the past. Unable to tolerate attempted MT intervention today. Pt could benefit from skilled PT services to address deficits and allow return to full daily and work activities w/o pn or dysfunction.  Barriers to therapy: n/a  Prognosis: good    Goals  Plan Goals: STG 4 wks  1) Pt to be compliant w/ initial HEP for ROM, strength, and symptom mgmt.  2) Pt to improve cervical ROM to 40 deg flxn, 30 deg extn, 25 deg SB, 35 deg rotation or better.  3) Pt to improve NDI score to 13/50 or better to reflect improved pn and function.    LTG 8 wks  1) Pt to be independent w/ long term HEP and self mgmt.  2) Pt to tolerate 40 mins or more of cervical/shoulder strengthening and mobility exercises w/ min to no inc in pn.  3) Pt to improve cervical ROM to 35 deg SB, 50 deg  rotation or better.  4) Pt to improve NDI score to 8/50 or better to reflect improved pn and function.  5) Pt to improve B shoulder strength to 4/5 or better to improve performance w/ ADLs.    Plan  Therapy options: will be seen for skilled therapy services  Planned modality interventions: cryotherapy, thermotherapy (hydrocollator packs), TENS, traction, ultrasound and dry needling  Planned therapy interventions: flexibility, functional ROM exercises, home exercise program, manual therapy, joint mobilization, neuromuscular re-education, postural training, soft tissue mobilization, spinal/joint mobilization, strengthening, stretching and therapeutic activities  Frequency: 2x week  Duration in weeks: 12  Treatment plan discussed with: patient  Plan details: PT POC to include progressive cervical/scapular strengthening, stretching program, neurodynamics, manual therapy techniques, modalities as indicated for pn control        History # of Personal Factors and/or Comorbidities: LOW (0)  Examination of Body System(s): # of elements: LOW (1-2)  Clinical Presentation: EVOLVING  Clinical Decision Making: LOW       Timed:         Manual Therapy:    0     mins  17288;     Therapeutic Exercise:    20     mins  41578;     Neuromuscular Heriberto:    0    mins  14664;    Therapeutic Activity:     0     mins  28279;     Gait Trainin     mins  63253;     Ultrasound:     0     mins  67096;    Ionto                               0    mins   65743  Self Care                       0     mins   82346  Canalith Repos    0     mins 05849      Un-Timed:  Electrical Stimulation:    0     mins  04774 ( );  Dry Needling     0     mins self-pay  Traction     0     mins 87531  Low Eval     25     Mins  54035  Mod Eval     0     Mins  50638  High Eval                       0     Mins  12003        Timed Treatment:   20   mins   Total Treatment:     45   mins          PT: Chasity Chowdhury, PT     [place] License: #[id number]    Electronically  signed by Chasity Chowdhury, PT, 09/09/24, 1:08 PM EDT    Certification Period: 9/10/2024 thru 12/8/2024  I certify that the therapy services are furnished while this patient is under my care.  The services outlined above are required by this patient, and will be reviewed every 90 days.         Physician Signature:__________________________________________________    PHYSICIAN: Jared Meraz MD      DATE:     Please sign and return via fax to [phone]. Thank you, [facility].

## 2024-09-17 ENCOUNTER — TREATMENT (OUTPATIENT)
Dept: PHYSICAL THERAPY | Facility: CLINIC | Age: 64
End: 2024-09-17
Payer: COMMERCIAL

## 2024-09-17 DIAGNOSIS — M54.2 PAIN, NECK: Primary | ICD-10-CM

## 2024-09-17 PROCEDURE — 97035 APP MDLTY 1+ULTRASOUND EA 15: CPT | Performed by: PHYSICAL THERAPIST

## 2024-09-17 PROCEDURE — 97140 MANUAL THERAPY 1/> REGIONS: CPT | Performed by: PHYSICAL THERAPIST

## 2024-09-17 PROCEDURE — 97110 THERAPEUTIC EXERCISES: CPT | Performed by: PHYSICAL THERAPIST

## 2024-09-17 PROCEDURE — 97014 ELECTRIC STIMULATION THERAPY: CPT | Performed by: PHYSICAL THERAPIST

## 2024-09-24 ENCOUNTER — TREATMENT (OUTPATIENT)
Dept: PHYSICAL THERAPY | Facility: CLINIC | Age: 64
End: 2024-09-24
Payer: COMMERCIAL

## 2024-09-24 DIAGNOSIS — M54.2 PAIN, NECK: Primary | ICD-10-CM

## 2024-09-24 PROCEDURE — 97110 THERAPEUTIC EXERCISES: CPT | Performed by: PHYSICAL THERAPIST

## 2024-09-24 PROCEDURE — 97035 APP MDLTY 1+ULTRASOUND EA 15: CPT | Performed by: PHYSICAL THERAPIST

## 2024-09-24 PROCEDURE — 97140 MANUAL THERAPY 1/> REGIONS: CPT | Performed by: PHYSICAL THERAPIST

## 2024-09-24 PROCEDURE — 97014 ELECTRIC STIMULATION THERAPY: CPT | Performed by: PHYSICAL THERAPIST

## 2024-09-24 PROCEDURE — 97112 NEUROMUSCULAR REEDUCATION: CPT | Performed by: PHYSICAL THERAPIST

## 2024-10-01 ENCOUNTER — TREATMENT (OUTPATIENT)
Dept: PHYSICAL THERAPY | Facility: CLINIC | Age: 64
End: 2024-10-01
Payer: COMMERCIAL

## 2024-10-01 DIAGNOSIS — M54.2 PAIN, NECK: Primary | ICD-10-CM

## 2024-10-01 PROCEDURE — 97035 APP MDLTY 1+ULTRASOUND EA 15: CPT | Performed by: PHYSICAL THERAPIST

## 2024-10-01 PROCEDURE — 97112 NEUROMUSCULAR REEDUCATION: CPT | Performed by: PHYSICAL THERAPIST

## 2024-10-01 PROCEDURE — 97140 MANUAL THERAPY 1/> REGIONS: CPT | Performed by: PHYSICAL THERAPIST

## 2024-10-01 PROCEDURE — 97110 THERAPEUTIC EXERCISES: CPT | Performed by: PHYSICAL THERAPIST

## 2024-10-01 PROCEDURE — 97014 ELECTRIC STIMULATION THERAPY: CPT | Performed by: PHYSICAL THERAPIST

## 2024-10-01 NOTE — PROGRESS NOTES
Physical Therapy Daily Treatment Note  [address] [facility] [facility]  [address]   [place] [address]      Patient: Kylah Osorio   : 1960  Referring practitioner: Jaerd Meraz MD  Date of Initial Visit: Type: THERAPY  Noted: 2024  Today's Date: 10/1/2024  Patient seen for 4 sessions       Visit Diagnoses:    ICD-10-CM ICD-9-CM   1. Pain, neck  M54.2 723.1       Subjective   Kylah Osorio reports: Really hurting today. Unsure why. Gets slight relief after PT visits but only lasts an hour or so and then is sore again. Very sore through neck/shoulders, R worse.    Pre tx pn score: 10  Post tx pn score: 6 R cervical; 0 L cervical    Treatment  See Exercise, Manual, and Modality Logs for complete treatment.     Objective     Cervical     Flexion: 35 degrees with pain  Extension: 20 (midline cervical pn from occiput to CT junction) degrees with pain  Left lateral flexion: 20 (ipsilateral lower cervical pn into shoulder) degrees with pain  Right lateral flexion: 20 (pn/tightness posterior cervical (R>L)) degrees with pain  Left rotation: 40 degrees with pain  Right rotation: 20 degrees with pain    Assessment & Plan       Assessment  Assessment details: Extremely TTP throughout B UT and R cervical PS. Limits tolerance to attempted cervical joint mobilization and soft tissue work. Multiple TPs noted in R UT, fewer on L. Unable to tolerate attempted release techniques. Still showing some improvement in cervical mobility from start of care. Pn significantly reduced at end of visit.    Plan  Plan details: Cont w/ gentle cervical joint/soft tissue mobility, postural exercise, modalities as needed for pn modulation          Timed:         Manual Therapy:    15     mins  69816;     Therapeutic Exercise:    20     mins  43774;     Neuromuscular Heriberto:    8    mins  47728;    Therapeutic Activity:     0     mins  32173;     Gait Trainin     mins  53740;     Ultrasound:     10     mins  38437;    Ionto                                0    mins   23943  Self Care                       0     mins   48810  Canalith Repos    0     mins 41140  Work Conditioning 1-2 hours    0    mins 71995  Work Conditioning ea add'l hour    0   mins 72116    Un-Timed:  Electrical Stimulation:    15     mins  86292 ( );  Dry Needling     0     mins self-pay  Traction     0     mins 40625      Timed Treatment:   53   mins   Total Treatment:     68   mins    Chasity Chowdhury, PT  [place] License: #[id number]

## 2024-10-08 ENCOUNTER — TREATMENT (OUTPATIENT)
Dept: PHYSICAL THERAPY | Facility: CLINIC | Age: 64
End: 2024-10-08
Payer: COMMERCIAL

## 2024-10-08 DIAGNOSIS — M54.2 PAIN, NECK: Primary | ICD-10-CM

## 2024-10-08 PROCEDURE — 97035 APP MDLTY 1+ULTRASOUND EA 15: CPT | Performed by: PHYSICAL THERAPIST

## 2024-10-08 PROCEDURE — 97140 MANUAL THERAPY 1/> REGIONS: CPT | Performed by: PHYSICAL THERAPIST

## 2024-10-08 PROCEDURE — 97110 THERAPEUTIC EXERCISES: CPT | Performed by: PHYSICAL THERAPIST

## 2024-10-08 NOTE — PROGRESS NOTES
Physical Therapy Daily Treatment Note                  [address] [facility] [address]  [place] [address]      Patient: Kylah Osorio   : 1960  Diagnosis/ICD-10 Code:  Pain, neck [M54.2]  Referring practitioner: Jared Meraz MD  Date of Initial Visit: Type: THERAPY  Noted: 2024  Today's Date: 10/8/2024  Patient seen for 5 sessions             Subjective   Kylah Osorio reports: no change in the pain on the right side but the left side doesn't seem as tight or irritated.     /10 pain R side of neck with movement    Objective          Active Range of Motion   Cervical/Thoracic Spine   Cervical    Flexion: 30 degrees   Extension: 20 degrees   Left lateral flexion: 30 degrees   Right lateral flexion: 20 degrees   Left rotation: 50 degrees   Right rotation: 56 degrees     Additional Active Range of Motion Details  Pulling/tightness through the right side of neck with all movements.       See Exercise, Manual, and Modality Logs for complete treatment.       Assessment/Plan  Significant increase in cervical rotation bilaterally post treatment with the addition of dry needling to the B UT. See PT note for details on dry needling intervention. F/u on symptoms relief, if any, next visit. 30 day reassessment next visit.   Progress per Plan of Care and Progress strengthening /stabilization /functional activity           Timed:  Manual Therapy:    15     mins  07712;  Therapeutic Exercise:    8     mins  28503;     Neuromuscular Heriberto:        mins  35206;    Therapeutic Activity:          mins  09911;     Gait Training:           mins  02809;     Ultrasound:     10     mins  58133;    Electrical Stimulation:         mins  96988;  Iontophoresis          mins  14044;  Work Conditioning 1-2 hr ___ mins 47342;  Work Conditioning ea additional hr ___ mins 93481    Untimed:  Electrical Stimulation:         mins  58663 ( );  Mechanical Traction:         mins  31563;    Fluidotherapy          mins  64703    Timed Treatment:   33   mins   Total Treatment:     33   mins        Chasity Cordoba PTA  Physical Therapist Assistant

## 2024-10-09 NOTE — PROGRESS NOTES
Physical Therapy Daily Treatment Note  [address] [facility]  [address]   [place] [address]      Patient: Kylah Osorio   : 1960  Referring practitioner: Jared Meraz MD  Date of Initial Visit: Type: THERAPY  Noted: 2024  Today's Date: 10/9/2024  Patient seen for 5 sessions       Visit Diagnoses:    ICD-10-CM ICD-9-CM   1. Pain, neck  M54.2 723.1       Subjective   Kylah Osorio reports: see PTA note for subjective info    Treatment  See Exercise, Manual, and Modality Logs for complete treatment.     Objective         Assessment & Plan       Assessment  Assessment details: After discussing indications, contraindications, precautions associated w/ dry needling pt provided written consent to proceed with treatment. Tolerated intervention well. Elicited multiple twitch responses L UT. No immediate change in pn but demo improved cervical mobility afterward-see PTA noted for obj data. Discussed heat application/stretching this evening to augment effects of DN. Pt verbalized understanding.      Plan  Plan details: Assess response to DN and proceed as indicated          Timed:         Manual Therapy:    0     mins  71505;     Therapeutic Exercise:    0     mins  58051;     Neuromuscular Heriberto:    0    mins  65128;    Therapeutic Activity:     0     mins  69995;     Gait Trainin     mins  85256;     Ultrasound:     0     mins  53871;    Ionto                               0    mins   79927  Self Care                       0     mins   69124  Canalith Repos    0     mins 98896  Work Conditioning 1-2 hours    0    mins 44018  Work Conditioning ea add'l hour    0   mins 60159    Un-Timed:  Electrical Stimulation:    0     mins  30228 (MC );  Dry Needling     8     mins TRIAL  Traction     0     mins 09501      Timed Treatment:   0   mins   Total Treatment:     8   mins    Chasity Chowdhury PT, DPT  [place] License: #[id number]

## 2024-10-15 ENCOUNTER — TREATMENT (OUTPATIENT)
Dept: PHYSICAL THERAPY | Facility: CLINIC | Age: 64
End: 2024-10-15
Payer: COMMERCIAL

## 2024-10-15 DIAGNOSIS — M54.2 PAIN, NECK: Primary | ICD-10-CM

## 2024-10-15 PROCEDURE — 97110 THERAPEUTIC EXERCISES: CPT | Performed by: PHYSICAL THERAPIST

## 2024-10-15 PROCEDURE — 97530 THERAPEUTIC ACTIVITIES: CPT | Performed by: PHYSICAL THERAPIST

## 2024-10-15 PROCEDURE — 97140 MANUAL THERAPY 1/> REGIONS: CPT | Performed by: PHYSICAL THERAPIST

## 2024-10-15 PROCEDURE — 97035 APP MDLTY 1+ULTRASOUND EA 15: CPT | Performed by: PHYSICAL THERAPIST

## 2024-10-15 NOTE — PROGRESS NOTES
Physical Therapy Reassessment  [address] [facility] [address]    [place] [address]  Patient: Kylah Osorio   : 1960  Diagnosis/ICD-10 Code:  Pain, neck [M54.2]  Referring practitioner: Jared Meraz MD  Date of Initial Visit: 10/15/2024  Today's Date: 10/15/2024  Patient seen for 6 sessions         Visit Diagnoses:    ICD-10-CM ICD-9-CM   1. Pain, neck  M54.2 723.1       Subjective Questionnaire: NDI:20/50=40% impairment  Clinical Progress: improved  Home Program Compliance: Yes  Treatment has included: therapeutic exercise, neuromuscular re-education, manual therapy, therapeutic activity, electrical stimulation, ultrasound, and dry needling      Subjective   Kylah Osorio reports: No change in headache pn, neck pn/tightness, or N/T in hands.     Pre tx pn score: 8  Post tx pn score: 4    Treatment  See Exercise, Manual, and Modality Logs for complete treatment.     Access Code: [email or web address]  URL: [email or web address]  Date: 10/15/2024  Prepared by: Chasity Chowdhury    Exercises  - Cervical Retraction at Wall  - 1-2 x daily - 10 reps - 5 sec hold  - Shoulder extension with resistance - Neutral  - 1-2 x daily - 15-30 reps  - Standing Shoulder Row with Anchored Resistance  - 1-2 x daily - 15-30 reps  - Shoulder External Rotation and Scapular Retraction  - 1-2 x daily - 15-30 reps    Objective          Tenderness     Additional Tenderness Details  Diffuse TTP B cervical PS, UT/LS, periscapular mm, lat/post deltoids, cervical sp/tp's, suboccipitals    Neurological Testing     Sensation   Cervical/Thoracic   Left   Intact: light touch    Right   Intact: light touch    Comments   Left light touch: paresthesia L 5th digit.     Reflexes   Left   Biceps (C5/C6): normal (2+)  Brachioradialis (C6): trace (1+)  Triceps (C7): normal (2+)    Right   Biceps (C5/C6): normal (2+)  Brachioradialis (C6): normal (2+)  Triceps (C7): normal (2+)    Active Range of Motion   Cervical/Thoracic Spine    Cervical    Flexion: 35 degrees with pain  Extension: 35 degrees with pain  Left lateral flexion: 15 (ipsilateral lower cervical pn into shoulder; improved to 20 deg after MT) degrees with pain  Right lateral flexion: 20 (pn/tightness posterior cervical (R>L)) degrees with pain  Left rotation: 40 (sitting; improved to 55 deg after MT) degrees with pain  Right rotation: 40 (sitting; improved to 50 deg after MT) degrees with pain    Additional Active Range of Motion Details  B shoulder elevation limited to approx 120 deg w/ mid/upper back pn  Shoulder ER WFL    Strength/Myotome Testing     Left Shoulder     Planes of Motion   Flexion: 3-   Abduction: 3-   External rotation at 0°: 4-     Right Shoulder     Planes of Motion   Flexion: 4+   Abduction: 4-   External rotation at 0°: 4+     Left Elbow   Flexion: 4-  Extension: 4-    Right Elbow   Flexion: 4+  Extension: 4-    Left Wrist/Hand   Wrist extension: 4  Wrist flexion: 4     (2nd hand position)   Left  strength (2nd hand position) 10 lbs    Right Wrist/Hand   Wrist extension: 5  Wrist flexion: 4+     (2nd hand position)   Right  strength (2nd hand position) 30 lbs    Additional Strength Details  Finger abd 3+/5 L; 4+/5 R    Tests     Additional Tests Details  Compression/distraction/spurling (-)  (-) onset UE symptoms w/ sustained cervical positions            Assessment & Plan       Assessment  Assessment details: Reassessment performed. Pt has completed 6 PT visits. Subjectively, she reports only short term improvement in B neck pn/tightness after PT visits. She denies any lasting effect. She has demonstrated good gains in neck ROM and pn reduction within treatment sessions after modalities, MT, and postural exercise. However she reports return of symptoms within hours afterward. Body habitus and posture are likely large contributing factors to her symptoms. I think she would benefit from additional PT visits to continue building  postural/scapular strength and gradually reduce load on cervical soft tissues. Pt agreeable. Issued updated HEP and provided TB for home use.    Goals  Plan Goals: STG 4 wks  1) Pt to be compliant w/ initial HEP for ROM, strength, and symptom mgmt.-MET  2) Pt to improve cervical ROM to 40 deg flxn, 30 deg extn, 25 deg SB, 35 deg rotation or better.-PARTIALLY MET  3) Pt to improve NDI score to 13/50 or better to reflect improved pn and function.-ONGOING    LTG 8 wks  1) Pt to be independent w/ long term HEP and self mgmt.-ONGOING  2) Pt to tolerate 40 mins or more of cervical/shoulder strengthening and mobility exercises w/ min to no inc in pn.-MET  3) Pt to improve cervical ROM to 35 deg SB, 50 deg rotation or better.-PARTIALLY MET  4) Pt to improve NDI score to 8/50 or better to reflect improved pn and function.-ONGOING  5) Pt to improve B shoulder strength to 4/5 or better to improve performance w/ ADLs.-ONGOING      Plan  Plan details: Cont w/ modalities for pn control, MT/stretching to improve neck mobility, postural strengthening        Progress toward previous goals: Partially Met        Timed:         Manual Therapy:    12     mins  88544;     Therapeutic Exercise:    8     mins  15801;     Neuromuscular Heriberto:    0    mins  66589;    Therapeutic Activity:     25     mins  14172;     Gait Trainin     mins  43403;     Ultrasound:     10     mins  21246;    Ionto                               0    mins   43241  Self Care                       0     mins   87116  Canalith Repos    0     mins 71576  Work Conditioning 1-2 hours    0    mins 37533  Work Conditioning ea add'l hour    0   mins 14727    Un-Timed:  Electrical Stimulation:    0     mins  45807 ( );  Dry Needling     0     mins self-pay  Traction     0     mins 55954  Re-Eval                           0    mins  02601      Timed Treatment:   55   mins   Total Treatment:     55   mins          PT: Chasity Chowdhury, PT     [place] License:  #[id number]    Electronically signed by Chasity Chowdhury, PT, 10/15/24, 2:33 PM EDT

## 2024-10-22 ENCOUNTER — TELEPHONE (OUTPATIENT)
Dept: PHYSICAL THERAPY | Facility: CLINIC | Age: 64
End: 2024-10-22

## 2024-12-18 ENCOUNTER — TRANSCRIBE ORDERS (OUTPATIENT)
Dept: ADMINISTRATIVE | Facility: HOSPITAL | Age: 64
End: 2024-12-18
Payer: COMMERCIAL

## 2024-12-18 DIAGNOSIS — Z12.31 VISIT FOR SCREENING MAMMOGRAM: Primary | ICD-10-CM

## 2025-02-24 ENCOUNTER — HOSPITAL ENCOUNTER (OUTPATIENT)
Dept: MAMMOGRAPHY | Facility: HOSPITAL | Age: 65
Discharge: HOME | End: 2025-02-24
Admitting: NURSE PRACTITIONER
Payer: COMMERCIAL

## 2025-02-24 DIAGNOSIS — Z12.31 VISIT FOR SCREENING MAMMOGRAM: ICD-10-CM

## 2025-02-24 PROCEDURE — 77067 SCR MAMMO BI INCL CAD: CPT

## 2025-02-24 PROCEDURE — 77063 BREAST TOMOSYNTHESIS BI: CPT

## 2025-02-25 PROCEDURE — 77063 BREAST TOMOSYNTHESIS BI: CPT | Performed by: RADIOLOGY

## 2025-02-25 PROCEDURE — 77067 SCR MAMMO BI INCL CAD: CPT | Performed by: RADIOLOGY

## 2025-03-10 ENCOUNTER — TRANSCRIBE ORDERS (OUTPATIENT)
Dept: PHYSICAL THERAPY | Facility: CLINIC | Age: 65
End: 2025-03-10
Payer: COMMERCIAL

## 2025-03-10 DIAGNOSIS — M47.816 LUMBAR SPONDYLOSIS: Primary | ICD-10-CM

## 2025-03-17 ENCOUNTER — TREATMENT (OUTPATIENT)
Dept: PHYSICAL THERAPY | Facility: CLINIC | Age: 65
End: 2025-03-17
Payer: COMMERCIAL

## 2025-03-17 DIAGNOSIS — M62.81 WEAKNESS OF TRUNK MUSCULATURE: ICD-10-CM

## 2025-03-17 DIAGNOSIS — M54.50 CHRONIC MIDLINE LOW BACK PAIN WITHOUT SCIATICA: Primary | ICD-10-CM

## 2025-03-17 DIAGNOSIS — M25.69 DECREASED ROM OF TRUNK AND BACK: ICD-10-CM

## 2025-03-17 DIAGNOSIS — G89.29 CHRONIC MIDLINE LOW BACK PAIN WITHOUT SCIATICA: Primary | ICD-10-CM

## 2025-03-17 PROCEDURE — 97110 THERAPEUTIC EXERCISES: CPT | Performed by: PHYSICAL THERAPIST

## 2025-03-17 PROCEDURE — 97162 PT EVAL MOD COMPLEX 30 MIN: CPT | Performed by: PHYSICAL THERAPIST

## 2025-03-17 PROCEDURE — 97112 NEUROMUSCULAR REEDUCATION: CPT | Performed by: PHYSICAL THERAPIST

## 2025-03-17 NOTE — PROGRESS NOTES
"Physical Therapy Initial Evaluation and Plan of Care  [address] [facility] [address]    [place] [address]  [phone]    Patient: Kylah Osorio   : 1960  Diagnosis/ICD-10 Code:  Chronic midline low back pain without sciatica [M54.50, G89.29]  Referring practitioner: Jared Meraz MD  Date of Initial Visit: 3/17/2025  Today's Date: 3/17/2025  Patient seen for 1 session         Visit Diagnoses:    ICD-10-CM ICD-9-CM   1. Chronic midline low back pain without sciatica  M54.50 724.2    G89.29 338.29   2. Decreased ROM of trunk and back  M25.69 719.59   3. Weakness of trunk musculature  M62.81 728.87         Subjective Questionnaire: Oswestry: 35/50=70% impairment       Subjective Evaluation    History of Present Illness  Mechanism of injury: Chronic midline LBP, can radiate up her spine and down into hips, lat/post thighs. Contributes to years of standing on concrete floors. Has had XR showing DDD. Has had \"rounds of injections\" that didn't provide any relief. Pn constant. Worsened w/ any position or activity. Never goes away. Has to use a cane to walk any distances. Denies N/T, bowel/bladder changes. Denies alleviating factors. Has to constantly move, change positions. No improvement w/ ice/heat. MD recently increased dosage w/ pn meds but didn't help. Lives in a one level home w/ 4-5 PRIMO, brother lives w/ her.     Subjective comment: chronic LBP  Patient Occupation: retired from [employer] this past January (28yrs) Quality of life: fair    Pain  Current pain ratin  At best pain ratin  At worst pain rating: 10    Social Support  Patient lives at: brother lives w/ her.    Diagnostic Tests  Abnormal x-ray: Degenerative disc disease at the L4/5 and L5/S1 levels.    Patient Goals  Patient goals for therapy: decreased pain, increased motion and increased strength           Treatment  See Exercise, Manual, and Modality Logs for complete treatment.     Access Code: [email or web address]  URL: " [email or web address]  Date: 03/17/2025  Prepared by: Chasity Chowdhury    Exercises  - Supine Diaphragmatic Breathing  - 2-3 x daily  - Supine Transversus Abdominis Bracing - Hands on Stomach  - 2-3 x daily - 10-20 reps  - Supine Posterior Pelvic Tilt  - 2-3 x daily - 10-20 reps  - Supine Lower Trunk Rotation  - 2-3 x daily - 5 reps  - Hooklying Gluteal Sets  - 2-3 x daily - 10-20 reps    Objective          Postural Observations    Additional Postural Observation Details  Ambulates independently w/ no arm swing or trunk rotation, apparent L lateral lean/trendelenburg pattern  Requires use of hands to rise from chair    Palpation   Left   Hypertonic in the lumbar paraspinals.   Tenderness of the lumbar paraspinals.     Right   Hypertonic in the lumbar paraspinals. Tenderness of the lumbar paraspinals.     Additional Palpation Details  TTP lumbar sp's    Neurological Testing     Sensation     Lumbar   Left   Intact: light touch    Right   Intact: light touch    Reflexes   Left   Patellar (L4): normal (2+)  Achilles (S1): normal (2+)    Right   Patellar (L4): normal (2+)  Achilles (S1): normal (2+)    Active Range of Motion     Additional Active Range of Motion Details  Trunk Flxn fingertips to superior patella-min lumbar flxn, hip dominant, inc midline low back pn   Trunk Extn WFL- \"stiffness\", no pn  Trunk Rotation L WFL, R 50%- no reported pn, \"stiffness\", min to no lumbopelvic dissocation  Trunk SB L 50%,   R 25% w/ inc pn/stiffness in low back     Hip IR/ER limited R>L, non painful      Passive Range of Motion     Additional Passive Range of Motion Details  Stiff/painful w/ attempted PA gliding lumbar segments    Strength/Myotome Testing     Left Hip   Planes of Motion   Flexion: 3-  Extension: 3-  Abduction: 3-    Right Hip   Planes of Motion   Flexion: 3-  Extension: 3-  Abduction: 3-    Left Knee   Flexion: 4  Extension: 4    Right Knee   Flexion: 3  Extension: 4-    Left Ankle/Foot   Dorsiflexion: " 4-  Plantar flexion: 3+    Right Ankle/Foot   Dorsiflexion: 3  Plantar flexion: 3-    Muscle Activation   Patient unable to activate left transverse abdominals, left multifidus, right transverse abdominals and right multifidus.     Tests       Thoracic   Negative slump.     Lumbar     Left   Positive quadrant.   Negative passive SLR.     Right   Positive quadrant.   Negative passive SLR.     Additional Tests Details  Active SLR-inc lumbar lordosis, (-) trans ab activation    Lumbar Flexibility Comments:   Hamstring: mild impairment  Quad: mild impairment  Hip Flexor: moderate impairment  Piriformis: moderate impairment          Assessment & Plan       Assessment  Impairments: abnormal or restricted ROM, activity intolerance, impaired physical strength, lacks appropriate home exercise program and pain with function   Functional limitations: carrying objects, lifting, sleeping, walking, pulling, pushing, uncomfortable because of pain, moving in bed, sitting, standing, stooping and unable to perform repetitive tasks   Assessment details: Pt is a 64 YOF w/ DM, HTN, HLD who presents to PT w/ complaint of chronic midline and bilateral lower back pn. Contributes to years of standing on concrete floors for work. Recently retired. No improvement w/ multiple rounds of injections through pn mgmt.  Pt's primary impairments include painful and limited trunk mobility in all planes, hypertonicity and TTP throughout lumbar PS mm bilaterally, BLE weakness, and inability to activate deep core musculature. Has difficulty dissociating trunk/pelvic mobility. Tight w/ hip rotation bilaterally. Pt denies radicular symptoms, bowel/bladder changes, saddle anesthesia. Sensation/reflexes intact. Initiated light trunk mobility exercise, diaphragmatic breathing to promote mm relaxation, and training for activation of deep core stabilizers to reduce overactivity of superficial trunk musculature. Very difficult for pt to activate trans ab,  improved some w/ practice. Pt would benefit from skilled PT services to address deficits, decrease pn, and improve function.  Barriers to therapy: chronicity  Prognosis: fair    Goals  Plan Goals: STG 4wks  1) Pt to be compliant w/ initial HEP for ROM, strength, and symptom mgmt.  2) Pt to report resting low back pn to be no greater than 4/10.  3) Pt to demo trans ab activation w/ ASLR test w/ min to no pn and w/o compensatory strategies.  4) Pt to perform sit to stand t/f independently w/o use of UE, w/ appropriate mechanics, and min to no pn.  5) Pt to improve Mod Oswestry score to 28/50 or better to reflect improved pn and function.    LTG 8wks  1) Pt to be independent w/ long term HEP and self mgmt.  2) Pt to improve B hip strength to 4/5 or better in all planes.  3) Pt to improve trunk mobility to at least 50% of expected norms in all planes w/o compensatory strategies and min to no pn.  4) Pt to improve Mod Oswestry score to 22/50 or better to reflect improved pn and function.    Plan  Therapy options: will be seen for skilled therapy services  Planned modality interventions: TENS, thermotherapy (hydrocollator packs), traction, ultrasound, cryotherapy and dry needling  Planned therapy interventions: abdominal trunk stabilization, ADL retraining, body mechanics training, flexibility, functional ROM exercises, home exercise program, joint mobilization, manual therapy, neuromuscular re-education, postural training, soft tissue mobilization, spinal/joint mobilization, strengthening, stretching and therapeutic activities  Frequency: 1x week (pt request)  Duration in weeks: 12  Treatment plan discussed with: patient  Plan details: PT POC to emphasize improved activation of deep core stabilizers, restoration of pn free trunk/hip mobility, generalized core/hip strengthening; modalities as indicated for pn control        History # of Personal Factors and/or Comorbidities: MODERATE (1-2)  Examination of Body System(s):  # of elements: MODERATE (3)  Clinical Presentation: EVOLVING  Clinical Decision Making: MODERATE      Timed:         Manual Therapy:    0     mins  84592;     Therapeutic Exercise:    10     mins  22745;     Neuromuscular Heriberto:    20    mins  18736;    Therapeutic Activity:     0     mins  78379;     Gait Trainin     mins  47229;     Ultrasound:     0     mins  46486;    Ionto                               0    mins   84719  Self Care                       0     mins   61162  Work Conditioning 1-2 hours    0    mins 90945  Work Conditioning ea add'l hour    0   mins 27966      Un-Timed:  Electrical Stimulation:    0     mins  84203 ( );  Dry Needling     0     mins self-pay  Traction     0     mins 70915  Low Eval     0     Mins 48650  Mod Eval     20     Mins 10878  High Eval                       0     Mins 80001  Re-Eval     0     Mins 96523  Canalith Repos    0     mins 44248      Timed Treatment:   30   mins   Total Treatment:     50   mins          PT: Chasity Chowdhury PT     License Number: [id number]  Electronically signed by Chasity Chowdhury PT, 25, 1:40 PM EDT    Certification Period: 3/17/2025 thru 2025  I certify that the therapy services are furnished while this patient is under my care.  The services outlined above are required by this patient, and will be reviewed every 90 days.         Physician Signature:__________________________________________________    PHYSICIAN: Jared Meraz MD  NPI: [id number]                                      DATE:

## 2025-03-24 ENCOUNTER — TREATMENT (OUTPATIENT)
Dept: PHYSICAL THERAPY | Facility: CLINIC | Age: 65
End: 2025-03-24
Payer: COMMERCIAL

## 2025-03-24 DIAGNOSIS — M62.81 WEAKNESS OF TRUNK MUSCULATURE: ICD-10-CM

## 2025-03-24 DIAGNOSIS — M54.50 CHRONIC MIDLINE LOW BACK PAIN WITHOUT SCIATICA: Primary | ICD-10-CM

## 2025-03-24 DIAGNOSIS — M25.69 DECREASED ROM OF TRUNK AND BACK: ICD-10-CM

## 2025-03-24 DIAGNOSIS — G89.29 CHRONIC MIDLINE LOW BACK PAIN WITHOUT SCIATICA: Primary | ICD-10-CM

## 2025-03-24 PROCEDURE — 97110 THERAPEUTIC EXERCISES: CPT | Performed by: PHYSICAL THERAPIST

## 2025-03-24 PROCEDURE — 97112 NEUROMUSCULAR REEDUCATION: CPT | Performed by: PHYSICAL THERAPIST

## 2025-03-24 PROCEDURE — 97014 ELECTRIC STIMULATION THERAPY: CPT | Performed by: PHYSICAL THERAPIST

## 2025-03-24 NOTE — PROGRESS NOTES
Physical Therapy Daily Treatment Note  [address] [facility]  [address]   [place] [address]  [phone]      Patient: Kylah Osorio   : 1960  Referring practitioner: Jared Meraz MD  Date of Initial Visit: Type: THERAPY  Noted: 3/17/2025  Today's Date: 3/24/2025  Patient seen for 2 sessions       Visit Diagnoses:    ICD-10-CM ICD-9-CM   1. Chronic midline low back pain without sciatica  M54.50 724.2    G89.29 338.29   2. Decreased ROM of trunk and back  M25.69 719.59   3. Weakness of trunk musculature  M62.81 728.87       Subjective   Kylah Osorio reports: able to do most of her exercises w/o issue, but can't tell if she is getting anything out of her pelvic tilts. Tried walking w/ arm swing but felt awkward. Pn unchanged.    Pre tx pn score: 4-5  Post tx pn score: 4    Objective   See Exercise, Manual, and Modality Logs for complete treatment.       Assessment & Plan       Assessment  Assessment details: Good tolerance to initial HEP. Demo improved volitional control of trans ab today, and able to maintain w/ addition of light LE movement. Has difficulty isolating R hip mobility w/ sidelying clamshell, required manual block to limit compensatory trunk rotation. Tolerated most progression w/o pn until performed standing press down into SB to facilitate deep core function in standing. Limited volume. Good response to estim/MHP to low back at end of visit.    Plan  Plan details: Cont per current POC; may attempt bridges          Timed:         Manual Therapy:    0     mins  82648;     Therapeutic Exercise:    30     mins  06415;     Neuromuscular Heriberto:    10    mins  44566;    Therapeutic Activity:     0     mins  68707;     Gait Trainin     mins  61921;     Ultrasound:     0     mins  58217;    Ionto                               0    mins   16554  Self Care                       0     mins   09260  Work Conditioning 1-2 hours    0    mins 19821  Work Conditioning ea add'l hour    0    mins 92055      Un-Timed:  Electrical Stimulation:    15     mins  57871 ( );  Dry Needling     0     mins self-pay  Traction     0     mins 60538  Canalith Repos    0     mins 60456    Timed Treatment:   40   mins   Total Treatment:     55   mins    Chasity Chowdhury, PT  [place] License: [id number]

## 2025-03-31 ENCOUNTER — TELEPHONE (OUTPATIENT)
Dept: PHYSICAL THERAPY | Facility: CLINIC | Age: 65
End: 2025-03-31

## 2025-03-31 NOTE — TELEPHONE ENCOUNTER
Caller: Kylah Osorio Gramajo    Relationship: Self    What was the call regarding: NOT FEELING WELL

## 2025-04-07 ENCOUNTER — TELEPHONE (OUTPATIENT)
Dept: PHYSICAL THERAPY | Facility: CLINIC | Age: 65
End: 2025-04-07

## 2025-04-07 NOTE — TELEPHONE ENCOUNTER
Caller: Kylah Osorio Gramajo    Relationship: Self         What was the call regarding: NOT FEELING WELL, GETTING LIGHTEDHEADED WHEN SHE STANDS UP , SEES  AT THE END OF THE WEEK